# Patient Record
Sex: FEMALE | Race: WHITE | URBAN - METROPOLITAN AREA
[De-identification: names, ages, dates, MRNs, and addresses within clinical notes are randomized per-mention and may not be internally consistent; named-entity substitution may affect disease eponyms.]

---

## 2021-03-15 ENCOUNTER — OFFICE VISIT (OUTPATIENT)
Dept: CARDIOLOGY CLINIC | Age: 71
End: 2021-03-15
Payer: COMMERCIAL

## 2021-03-15 VITALS
SYSTOLIC BLOOD PRESSURE: 122 MMHG | WEIGHT: 171 LBS | HEIGHT: 67 IN | RESPIRATION RATE: 16 BRPM | BODY MASS INDEX: 26.84 KG/M2 | DIASTOLIC BLOOD PRESSURE: 80 MMHG | HEART RATE: 73 BPM

## 2021-03-15 DIAGNOSIS — I20.0 UNSTABLE ANGINA (HCC): Primary | ICD-10-CM

## 2021-03-15 DIAGNOSIS — R07.89 CHEST TIGHTNESS: ICD-10-CM

## 2021-03-15 PROCEDURE — 99204 OFFICE O/P NEW MOD 45 MIN: CPT | Performed by: INTERNAL MEDICINE

## 2021-03-15 RX ORDER — BUPROPION HYDROCHLORIDE 150 MG/1
TABLET ORAL
COMMUNITY
Start: 2021-03-05

## 2021-03-15 RX ORDER — BUPROPION HYDROCHLORIDE 300 MG/1
TABLET ORAL
COMMUNITY
Start: 2021-01-25

## 2021-03-15 RX ORDER — FLUOXETINE HYDROCHLORIDE 20 MG/1
CAPSULE ORAL
COMMUNITY
Start: 2021-02-22

## 2021-03-15 RX ORDER — GABAPENTIN 600 MG/1
TABLET ORAL
COMMUNITY
Start: 2021-02-10

## 2021-03-15 NOTE — PROGRESS NOTES
[facility] [place]:   [phone]    History of Present Illness:  Ms. Carson Gustafson is a 74 yo F referred by Dr. Edgardo Eric for cardiac evaluation. She has been having episodes of chest discomfort/\"tightness\" for the last two months. This can happen daily, sometimes worse with exercise and activity, but can also happen at rest.  When this does occur, it often times can last for minutes to hours at a time. There is always some degree of chest pressure. She does not know any clear triggers that happened two months ago. When this does happen she does get some shortness of breath. No significant palpitations, lightheadedness or dizziness. She denies any prior cardiac history. She did have back surgery for a bulging disk in the past. She is compensated on exam with clear lungs and no lower extremity edema. Her EKG was normal sinus rhythm, nonspecific ST-T wave abnormality. Soc hx. No tobacco  Fam hx. No early CAD  Assessment and Plan:  Unstable angina. Chest pain with typical and atypical features; will proceed with a treadmill stress echocardiogram for further evaluation. If this is unrevealing, would consider musculoskeletal or GI etiology. She  has no past medical history on file. All other systems negative except as above. PE  Vitals:    03/15/21 1116   BP: 122/80   Pulse: 73   Resp: 16   Weight: 171 lb (77.6 kg)   Height: 5' 7.32\" (1.71 m)    Body mass index is 26.53 kg/m².    General appearance - alert, well appearing, and in no distress  Mental status - affect appropriate to mood  Eyes - sclera anicteric, moist mucous membranes  Neck - supple, no JVD  Chest - clear to auscultation, no wheezes, rales or rhonchi  Heart - normal rate, regular rhythm, normal S1, S2, no murmurs, rubs, clicks or gallops  Abdomen - soft, nontender, nondistended, no masses or organomegaly  Neurological - no focal deficit  Extremities - peripheral pulses normal, no pedal edema      Recent Labs:  No results found for: CHOL, CHOLX, CHLST, CHOLV, [id number], HDL, HDLP, LDL, LDLC, DLDLP, TGLX, TRIGL, TRIGP, CHHD, CHHDX  No results found for: ESTELA, CREAPOC, ACREA, CREA, REFC3, REFC4  No results found for: BUN, BUNPOC, IBUN, MBUNV, BUNV  No results found for: K, KI, PLK, WBK  No results found for: HBA1C, HGBE8, [id number]  No results found for: HGBPOC, HGB, HGBP, HGBEXT  No results found for: PLT, PLTEXT    Reviewed:  No past medical history on file. Social History     Tobacco Use   Smoking Status Not on file     Social History     Substance and Sexual Activity   Alcohol Use Not on file     Not on File    Current Outpatient Medications   Medication Sig    gabapentin (NEURONTIN) 600 mg tablet TAKE 2 TABLETS BY MOUTH AT BEDTIME    buPROPion XL (WELLBUTRIN XL) 300 mg XL tablet TAKE 1 TABLET BY MOUTH EVERY DAY **VISIT REQUIRED FOR REFILL    buPROPion XL (WELLBUTRIN XL) 150 mg tablet     FLUoxetine (PROzac) 20 mg capsule TAKE 1 CAPSULE BY MOUTH EVERY DAY     No current facility-administered medications for this visit.         Jian Null MD  [facility] [place]  [address] [facility] [address]  [address]

## 2021-03-15 NOTE — LETTER
Patient:  Karthikeyan Koo YOB: 1950 Date of Visit: 3/15/2021 [address] [facility] [address] [place] [id number] Via Fax: [phone]: Thank you for referring Ms. Nedra Morales to me for evaluation/treatment. Below are the relevant portions of my assessment and plan of care. Ms. Silver Collins is a 74 yo F referred by Dr. Elysia Webster for cardiac evaluation. She has been having episodes of chest discomfort/\"tightness\" for the last two months. This can happen daily, sometimes worse with exercise and activity, but can also happen at rest.  When this does occur, it often times can last for minutes to hours at a time. There is always some degree of chest pressure. She does not know any clear triggers that happened two months ago. When this does happen she does get some shortness of breath. No significant palpitations, lightheadedness or dizziness. She denies any prior cardiac history. She did have back surgery for a bulging disk in the past. She is compensated on exam with clear lungs and no lower extremity edema. Her EKG was normal sinus rhythm, nonspecific ST-T wave abnormality. Soc hx. No tobacco 
Fam hx. No early CAD Assessment and Plan:  Unstable angina. Chest pain with typical and atypical features; will proceed with a treadmill stress echocardiogram for further evaluation. If this is unrevealing, would consider musculoskeletal or GI etiology. If you have questions, please do not hesitate to call me. Sincerely, Eli Burnett MD

## 2021-04-07 ENCOUNTER — ANCILLARY PROCEDURE (OUTPATIENT)
Dept: CARDIOLOGY CLINIC | Age: 71
End: 2021-04-07
Payer: COMMERCIAL

## 2021-04-07 VITALS
WEIGHT: 171 LBS | HEIGHT: 67 IN | BODY MASS INDEX: 26.84 KG/M2 | DIASTOLIC BLOOD PRESSURE: 70 MMHG | SYSTOLIC BLOOD PRESSURE: 124 MMHG

## 2021-04-07 DIAGNOSIS — R07.89 CHEST TIGHTNESS: ICD-10-CM

## 2021-04-07 DIAGNOSIS — I20.0 UNSTABLE ANGINA (HCC): ICD-10-CM

## 2021-04-07 LAB
STRESS ANGINA INDEX: 0
STRESS BASELINE DIAS BP: 70 MMHG
STRESS BASELINE HR: 80 BPM
STRESS BASELINE SYS BP: 124 MMHG
STRESS ESTIMATED WORKLOAD: 10.1 METS
STRESS EXERCISE DUR MIN: NORMAL
STRESS O2 SAT PEAK: 97 %
STRESS PEAK DIAS BP: 80 MMHG
STRESS PEAK SYS BP: 200 MMHG
STRESS PERCENT HR ACHIEVED: 99 %
STRESS POST PEAK HR: 150 BPM
STRESS RATE PRESSURE PRODUCT: NORMAL BPM*MMHG
STRESS ST DEPRESSION: 0.5 MM
STRESS TARGET HR: 151 BPM

## 2021-04-07 PROCEDURE — 93351 STRESS TTE COMPLETE: CPT | Performed by: SPECIALIST

## 2021-04-12 ENCOUNTER — TELEPHONE (OUTPATIENT)
Dept: CARDIOLOGY CLINIC | Age: 71
End: 2021-04-12

## 2021-04-12 NOTE — TELEPHONE ENCOUNTER
· Echo: Negative stress echocardiogram for evidence of ischemia. · Baseline ECG: Normal sinus rhythm.

## 2023-05-20 RX ORDER — GABAPENTIN 600 MG/1
2 TABLET ORAL NIGHTLY
COMMUNITY
Start: 2021-02-10

## 2023-05-20 RX ORDER — FLUOXETINE HYDROCHLORIDE 20 MG/1
1 CAPSULE ORAL DAILY
COMMUNITY
Start: 2021-02-22

## 2023-05-20 RX ORDER — BUPROPION HYDROCHLORIDE 150 MG/1
TABLET ORAL
COMMUNITY
Start: 2021-03-05

## 2023-05-20 RX ORDER — BUPROPION HYDROCHLORIDE 300 MG/1
TABLET ORAL
COMMUNITY
Start: 2021-01-25